# Patient Record
Sex: MALE | Race: OTHER | HISPANIC OR LATINO | URBAN - METROPOLITAN AREA
[De-identification: names, ages, dates, MRNs, and addresses within clinical notes are randomized per-mention and may not be internally consistent; named-entity substitution may affect disease eponyms.]

---

## 2019-12-20 ENCOUNTER — EMERGENCY (EMERGENCY)
Facility: HOSPITAL | Age: 29
LOS: 0 days | Discharge: HOME | End: 2019-12-20
Attending: EMERGENCY MEDICINE
Payer: MEDICAID

## 2019-12-20 VITALS — HEIGHT: 65.75 IN | WEIGHT: 115.96 LBS

## 2019-12-20 VITALS
OXYGEN SATURATION: 100 % | RESPIRATION RATE: 18 BRPM | TEMPERATURE: 99 F | DIASTOLIC BLOOD PRESSURE: 81 MMHG | HEART RATE: 70 BPM | SYSTOLIC BLOOD PRESSURE: 137 MMHG

## 2019-12-20 DIAGNOSIS — S80.811A ABRASION, RIGHT LOWER LEG, INITIAL ENCOUNTER: ICD-10-CM
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
## 2019-12-20 DIAGNOSIS — M79.661 PAIN IN RIGHT LOWER LEG: ICD-10-CM

## 2019-12-20 DIAGNOSIS — S80.11XA CONTUSION OF RIGHT LOWER LEG, INITIAL ENCOUNTER: ICD-10-CM
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
## 2019-12-20 DIAGNOSIS — Z23 ENCOUNTER FOR IMMUNIZATION: ICD-10-CM

## 2019-12-20 LAB — CK SERPL-CCNC: 508 U/L — HIGH (ref 26–308)

## 2019-12-20 PROCEDURE — 73590 X-RAY EXAM OF LOWER LEG: CPT | Mod: 26,RT

## 2019-12-20 PROCEDURE — 73600 X-RAY EXAM OF ANKLE: CPT | Mod: RT

## 2019-12-20 PROCEDURE — 73600 X-RAY EXAM OF ANKLE: CPT | Mod: 26,RT

## 2019-12-20 PROCEDURE — 90715 TDAP VACCINE 7 YRS/> IM: CPT

## 2019-12-20 PROCEDURE — 99284 EMERGENCY DEPT VISIT MOD MDM: CPT | Mod: 25

## 2019-12-20 PROCEDURE — 36415 COLL VENOUS BLD VENIPUNCTURE: CPT

## 2019-12-20 PROCEDURE — 73562 X-RAY EXAM OF KNEE 3: CPT | Mod: RT

## 2019-12-20 PROCEDURE — 90471 IMMUNIZATION ADMIN: CPT

## 2019-12-20 PROCEDURE — 73562 X-RAY EXAM OF KNEE 3: CPT | Mod: 26,RT

## 2019-12-20 PROCEDURE — 82550 ASSAY OF CK (CPK): CPT

## 2019-12-20 PROCEDURE — 99245 OFF/OP CONSLTJ NEW/EST HI 55: CPT

## 2019-12-20 PROCEDURE — 73590 X-RAY EXAM OF LOWER LEG: CPT | Mod: RT

## 2019-12-20 PROCEDURE — 99284 EMERGENCY DEPT VISIT MOD MDM: CPT

## 2019-12-20 RX ORDER — IBUPROFEN 200 MG
600 TABLET ORAL ONCE
Refills: 0 | Status: COMPLETED | OUTPATIENT
Start: 2019-12-20 | End: 2019-12-20

## 2019-12-20 RX ORDER — TETANUS TOXOID, REDUCED DIPHTHERIA TOXOID AND ACELLULAR PERTUSSIS VACCINE, ADSORBED 5; 2.5; 8; 8; 2.5 [IU]/.5ML; [IU]/.5ML; UG/.5ML; UG/.5ML; UG/.5ML
0.5 SUSPENSION INTRAMUSCULAR ONCE
Refills: 0 | Status: COMPLETED | OUTPATIENT
Start: 2019-12-20 | End: 2019-12-20

## 2019-12-20 RX ORDER — OXYCODONE AND ACETAMINOPHEN 5; 325 MG/1; MG/1
1 TABLET ORAL ONCE
Refills: 0 | Status: DISCONTINUED | OUTPATIENT
Start: 2019-12-20 | End: 2019-12-20

## 2019-12-20 RX ADMIN — OXYCODONE AND ACETAMINOPHEN 1 TABLET(S): 5; 325 TABLET ORAL at 16:51

## 2019-12-20 RX ADMIN — TETANUS TOXOID, REDUCED DIPHTHERIA TOXOID AND ACELLULAR PERTUSSIS VACCINE, ADSORBED 0.5 MILLILITER(S): 5; 2.5; 8; 8; 2.5 SUSPENSION INTRAMUSCULAR at 15:45

## 2019-12-20 RX ADMIN — Medication 600 MILLIGRAM(S): at 15:45

## 2019-12-20 NOTE — ED STATDOCS - PROGRESS NOTE DETAILS
signed Maria E Forde PA-C Pt seen initially in intake by Dr Richard.  ID [id number]  29M c/o right lower leg pain/injury after it got caught between a work vehicle and a plate that he stands on while operating a riding mower. pt didn't see the work vehicle and backed into in on the mower and his right lower leg was briefly caught between the 2 objects. Pt c/o pain and unable to wt bear since the accident. Also c/o numbness in right ankle. No significant findings on xray. Pt with significant TTP of right anterior compartment, moderate swelling. pain with passive plantarflexion/dorsiflexion of ankle. 2+DP/PT pulse. toes warm and pink, cap refill<2 sec all digits. Gross motor/sensation intact. No significant findings on xray.  I spoke to orthopedic resident who advised pt should be seen by surgery since there is no fx. I spoke to Dr Estrada for sx who will have resident see the pt but also requires ortho to see the pt in ED as well. signed Maria E Forde PA-C Pt seen initially in intake by Dr Richard.  ID [id number]  29M c/o right lower leg pain/injury after it got caught between a work vehicle and a plate that he stands on while operating a riding mower. pt didn't see the work vehicle and backed into in on the mower and his right lower leg was briefly caught between the 2 objects. Pt c/o pain and unable to wt bear since the accident. Also c/o numbness in right ankle. No significant findings on xray. Pt with significant TTP of right anterior compartment, moderate swelling. pain with passive plantarflexion/dorsiflexion of ankle. 2+DP/PT pulse. toes warm and pink, cap refill<2 sec all digits. Gross motor/sensation intact. No significant findings on xray.  I spoke to orthopedic resident Juan Jose who advised pt should be seen by surgery since there is no fx. I spoke to Dr Estrada for sx who will have resident see the pt but also requires ortho to see the pt in ED as well. Ortho resident Juan Jose conforms he will come see pt. signed Maria E Forde PA-C  ID [id number]  CPK not significantly elevated. No compartment syndrome, pt seen by ortho and surgery. outpt f/u WBAT, RICE. return precautions given, outpt f/u ortho. rx oxycodone. Pt feeling well at DC, agrees with DC and plan of care, ambulates without crutches after percocet, declines crutches. Pts boss giving him a ride home. Contusion/hematoma of lower leg. pulses 2+ DP/PT at DC.

## 2019-12-20 NOTE — ED STATDOCS - CARE PROVIDER_API CALL
Shan Nieves (MD)  Orthopaedic Surgery  [address] [place] [address] [place] [address]  Phone: [phone]  Fax: [phone]  Follow Up Time: 1-3 Days

## 2019-12-20 NOTE — CONSULT NOTE ADULT - SUBJECTIVE AND OBJECTIVE BOX
29M with R lower leg injury earlier today. Pt is primarily Latvian speaking and translation was done at bedside by patient's friend. Pt denied  services. Pt was on a riding lawn  when he fell off and had his right leg struck between the  and a car bumper. He was able to walk on it afterwards but with significant pain to RLE. Denies HS/LOC. Admits to numbness/tingling around dorsal foot and ankle. Denies pain/injury elsewhere. No other complaints. Of note, Pt had L clavicle ORIF done over 10 years ago at [facility]. No other orthopedic history.    CONSTITUTIONAL: No fever or chills  HEENT:  No headache, no sore throat  RESPIRATORY: No cough, wheezing, or shortness of breath  CARDIOVASCULAR: No chest pain, palpitations, or leg swelling  GASTROINTESTINAL: No nausea, vomiting, or diarrhea  GENITOURINARY: No dysuria, frequency, or hematuria  NEUROLOGICAL: no focal weakness or dizziness  SKIN:  No rashes or lesions   MUSCULOSKELETAL: no myalgias   PSYCHIATRIC: No depression or anxiety    PAST MEDICAL & SURGICAL HISTORY:  No pertinent past medical history  No significant past surgical history    Home Medications:  Denies    Allergies    No Known Allergies    Intolerances    Vital Signs Last 24 Hrs  T(C): 36.8 (20 Dec 2019 15:13), Max: 36.8 (20 Dec 2019 15:13)  T(F): 98.2 (20 Dec 2019 15:13), Max: 98.2 (20 Dec 2019 15:13)  HR: 80 (20 Dec 2019 15:13) (80 - 80)  BP: 159/108 (20 Dec 2019 15:13) (159/108 - 159/108)  BP(mean): --  RR: 18 (20 Dec 2019 15:13) (18 - 18)  SpO2: 100% (20 Dec 2019 15:13) (100% - 100%)    Imaging: XRays of R knee, tib/fib and ankle: No obvious fractures or bony pathology noted    Physical  Gen: NAD, AAOx3  RLE: +superficial abrasion over R mid lateral calf and posterior calf, bleeding controlled, no purulent fluid or foreign material noted, no bruising/erythema noted, +EHL/FHL/TA/GS, SILT L3-S1, diminished sensation in dorsal 1st web space and lateral ankle, +dp/pt pulses intact, compartments soft/compressible, +pain in calf with passive dorsiflexion/plantar flexion    Secondary Survey: No TTP over bony prominences, SILT, palpable pulses, full/painless range of motion, compartments soft

## 2019-12-20 NOTE — ED STATDOCS - OBJECTIVE STATEMENT
30 y/o male with no significant PMHx presents to the ED c/o right leg injury 30 minutes PTA. Pt is a , was working and was hit in his right leg by one of the machines. Pt now c/o of right leg pain. No other injuries. Pt unable to bear weight or ambulate secondary to pain. No other complaints at this time.

## 2019-12-20 NOTE — ED ADULT NURSE NOTE - OBJECTIVE STATEMENT
pt right shin was caught  between leaves blower and a trak while working . swelling to right shin with abrasion on the calf. pt stats he can not  bear weight on the right leg. denies any other injuries.

## 2019-12-20 NOTE — CONSULT NOTE ADULT - ASSESSMENT
29M with Right lower leg contusion  Pain control  Elevate RLE  Abrasions cleaned with hydrogen peroxide and dressed with 4x4s and ACE  No signs of compartment syndrome at this time  Advised patient if pain/swelling/numbness persists and worsens to return to ED for re-evaluation  No plan for orthopedic surgery intervention at this time  Follow up with Dr. Nieves as outpatient as needed, call office for appointment  Ortho stable for d/c

## 2019-12-20 NOTE — CONSULT NOTE ADULT - ASSESSMENT
28 y/o male with no significant PMHx presents to the ED c/o right leg injury 30 minutes PTA. Pt is a , was working and was hit in his right leg by one of the machines. Pt now c/o of right leg pain. No other injuries. Pt unable to bear weight or ambulate secondary to pain. No other complaints at this time.        Plan:    - Give tetanus  shot.  - Get HOWIE of the right leg.  - Please get CPK.  - Get Ortho evaluation.  - All compartments are soft, no signs of compartment syndrome by examination.  - No surgical intervention needed, surgery is signing off this patient, please reconsult again if needed, thanks.    The plan was discussed with Dr. Clemons 30 y/o male with no significant PMHx presents to the ED c/o right leg injury 30 minutes PTA. Pt is a , was working and was hit in his right leg by one of the machines. Pt now c/o of right leg pain. No other injuries. Pt unable to bear weight or ambulate secondary to pain. No other complaints at this time.    Right lower leg contusion    Plan:    - Give tetanus  shot.  - Get HOWIE of the right leg.  - Please get CPK.  Ortho on consult, no intervention, cleared for d/c with outpt follow up.  - All compartments are soft, no signs of compartment syndrome by examination.  - No general surgical intervention needed, trauma surgery is signing off this patient, please reconsult again if needed, thanks.    The plan was discussed with Dr. Clemons

## 2019-12-20 NOTE — ED STATDOCS - MUSCULOSKELETAL, MLM
TTP and swelling right lower shin/calf with 2 abrasions, one lateral and one posterior. Ankle and knee within normal limits.

## 2019-12-20 NOTE — ED STATDOCS - PATIENT PORTAL LINK FT
You can access the FollowMyHealth Patient Portal offered by [facility] by registering at the following website: [email or web address]. By joining [employer]’s FollowMyHealth portal, you will also be able to view your health information using other applications (apps) compatible with our system.

## 2019-12-20 NOTE — ED STATDOCS - NSFOLLOWUPINSTRUCTIONS_ED_ALL_ED_FT
Hematoma  Un hematoma es karley acumulación de james debajo de la piel, en un órgano, en un sitio del cuerpo, en un espacio articular o en otro tejido. La james puede espesarse (coagularse) para formar un bulto que se puede vida y sentir. El bulto suele ser firme y puede ser doloroso y sensible. La mayoría de los hematomas mejoran en unos pocos días o semanas. Sin embargo, algunos hematomas pueden ser graves y requieren atención médica. Los hematomas pueden variar desde muy pequeños hasta muy grandes.  ¿Cuáles son las causas?  Las causas de esta afección son:  Karley lesión cerrada o penetrante.Pérdida de james de un vaso sanguíneo bajo la piel.Algunos procedimientos médicos, incluidas las cirugías, mary las cirugías bucales, las de eliminación de arrugas y las de articulaciones.Algunas afecciones médicas que causan sangrado o moretones. Pueden aparecer varios hematomas en diferentes partes del cuerpo.¿Qué incrementa el riesgo?  Es más probable que contraiga esta afección si:  Es un adulto mayor.Usa anticoagulantes.¿Cuáles son los signos o los síntomas?     Los síntomas de esta afección dependen del lugar donde se encuentre el hematoma.   Los síntomas comunes de un hematoma que está debajo de la piel incluyen los siguientes:  Un bulto firme en el cuerpo.Dolor y sensibilidad en la keena.Moretones. La piel puede tornarse de color april, fallon púrpura o amarillo (coloración) en el lugar del hematoma si gibran está cerca de la superficie de la piel.Los síntomas comunes de un hematoma que está en un lugar profundo de los tejidos o espacios corporales pueden ser menos evidentes. Estos incluyen los siguientes:  Karley acumulación de james en el estómago (hematoma intraabdominal). [place] puede causar dolor en el abdomen, debilidad, desmayos y falta de aire. Karley acumulación de james dentro de la arden (hematoma intracraneal). Esta puede causar dolor de arden o síntomas mary debilidad, dificultad para hablar o para entender, o un cambio en el estado de conciencia. ¿Cómo se diagnostica?  Esta afección se diagnostica en función de lo siguiente:  Tracee antecedentes médicos.Un examen físico.Estudios de diagnóstico por imágenes, mary karley ecografía o karley exploración por tomografía computarizada (TC). [place] será necesario si el médico sospecha que hay un hematoma en tejidos o espacios corporales más profundos.Análisis de james. Puede que deban realizarle estos análisis si perez médico tima que el hematoma es la consecuencia de karley afección.¿Cómo se trata?  El tratamiento de esta afección depende de la causa, del tamaño y de la ubicación del hematoma. El tratamiento puede incluir:  No hacer nada. La mayoría de los hematomas no necesitan tratamiento porque muchos de ellos desaparecen solos con el tiempo.Cirugía o control minucioso. [place] puede ser necesario para los hematomas grandes o los hematomas que afectan a los órganos vitales.Medicamentos. Es posible que le den medicamentos si el hematoma tiene karley causa médica subyacente.Siga estas indicaciones en perez casa:  Control del dolor, la rigidez y la hinchazón        Si se lo indican, aplique hielo sobre la keena afectada.  Ponga el hielo en karley bolsa plástica.Coloque karley toalla entre la piel y la bolsa.Deje el hielo yohannes 20 minutos, de 2 a 3 veces por día, yohannes los primeros días.Si se lo indican, aplique calor en la keena afectada después de aplicar hielo por un par de días. Use la gay de calor que el médico le recomiende, mary karley compresa de calor húmedo o karley almohadilla térmica.  Coloque karley toalla entre la piel y la gay de calor. Aplique calor yohannes 20 a 30 minutos. Retire la gay de calor si la piel se pone de color fallon brillante. [place] es especialmente importante si no puede sentir dolor, calor o frío. Puede correr un riesgo mayor de sufrir quemaduras.Cuando esté sentado o acostado, levante (eleve) la keena afectada por encima del nivel del corazón.Si se lo indican, envuelva la keena afectada con karley venda elástica. La venda aplica presión (compresión) en la keena, lo que puede ayudar a reducir la hinchazón y a promover la curación. No la ajuste demasiado alrededor de la keena afectada.Si el hematoma está en karley pierna o un pie (extremidad inferior) y duele, puede que perez médico le recomiende el uso de muletas. Úselas mary se lo haya indicado el médico.Indicaciones generales     [place] los medicamentos de venta christopher y los recetados solamente mary se lo haya indicado el médico.Concurra a todas las visitas de seguimiento mary se lo haya indicado el médico. [place] es importante.Comuníquese con un médico si:  Tiene fiebre.La hinchazón y la coloración empeoran.Aparecen nuevos hematomas.Solicite ayuda inmediatamente si:  El dolor empeora o no se controla con los medicamentos.La piel sobre el hematoma se agrieta o comienza a sangrar.El hematoma se encuentra en el tórax o el abdomen y siente debilidad, le falta el aire o se altera perez conocimiento.Tiene un hematoma en el cuero cabelludo causado por karley caída o karley lesión y también tiene:  Un dolor de arden que empeora.Dificultad para hablar o comprender el lenguaje. Debilidad.Cambios en el estado de alerta o en la conciencia.Resumen  Un hematoma es karley acumulación de james debajo de la piel, en un órgano, en un sitio del cuerpo, en un espacio articular o en otro tejido.Generalmente, esta afección no necesita tratamiento, ya que muchos hematomas desaparecen solos con el tiempo.Los hematomas más grandes o los que puedan afectar órganos vitales pueden requerir un drenaje quirúrgico o controles. Si el hematoma es la consecuencia de karley afección, puede que le receten medicamentos.Solicite ayuda de inmediato si el hematoma se rompe o comienza a sangrar o si usted siente que le falta el aire, tiene dolor de arden o tiene dificultad para hablar después de karley caída.Esta información no tiene mary fin reemplazar el consejo del médico. Asegúrese de hacerle al médico cualquier pregunta que tenga.

## 2019-12-20 NOTE — CONSULT NOTE ADULT - SUBJECTIVE AND OBJECTIVE BOX
28 y/o male with no significant PMHx presents to the ED c/o right leg injury 30 minutes PTA. Pt is a , was working and was hit in his right leg by one of the machines. Pt now c/o of right leg pain. No other injuries. Pt unable to bear weight or ambulate secondary to pain. No other complaints at this time.      PAST MEDICAL/SURGICAL/FAMILY/SOCIAL HISTORY:   Past Medical History:  No pertinent past medical history.    ICU Vital Signs Last 24 Hrs  T(C): 36.8 (20 Dec 2019 15:13), Max: 36.8 (20 Dec 2019 15:13)  T(F): 98.2 (20 Dec 2019 15:13), Max: 98.2 (20 Dec 2019 15:13)  HR: 80 (20 Dec 2019 15:13) (80 - 80)  BP: 159/108 (20 Dec 2019 15:13) (159/108 - 159/108)  BP(mean): --  ABP: --  ABP(mean): --  RR: 18 (20 Dec 2019 15:13) (18 - 18)  SpO2: 100% (20 Dec 2019 15:13) (100% - 100%)      PHYSICAL EXAM:   · CONSTITUTIONAL: well appearing and in no apparent distress.	  · EYES: clear bilaterally.  Pupils equal, round, and reactive to light.	  · ENMT: Nasal mucosa clear.  Mouth with normal mucosa  Throat has no vesicles, no oropharyngeal exudates and uvula is midline.	  · CARDIAC: normal rate, regular rhythm, and no murmur.	  · RESPIRATORY: breath sounds clear and equal bilaterally.	  · GASTROINTESTINAL: abdomen soft, non-tender, and non-distended. Bowel sounds present.	  · MUSCULOSKELETAL: TTP and swelling right lower shin/calf with 2 abrasions, one lateral and one posterior. Ankle and knee within normal limits. DP and PT are palpable bilaterally. All compartments are soft.	  · NEUROLOGICAL: sensation is normal and strength is normal.	  · SKIN: skin normal color for race, warm, dry and intact.	    Imaging:    EXAM:  XR KNEE 3 VIEWS RT                            PROCEDURE DATE:  12/20/2019          INTERPRETATION:  CLINICAL HISTORY:Injury with  RIGHT knee pain.    TECHNIQUE: AP, lateral, and oblique radiographs     FINDINGS: The bone mineralization is normal. There is no fracture or dislocation. The joint spaces are unremarkable. No osseous lesion is noted. There is no effusion. No soft tissue abnormalities are identified.     IMPRESSION:  No radiographic osseous pathology.  If pain persist despite conservative therapy and soft tissue internal derangement or occult fracture is clinically suspected follow-up MRI recommended. Trauma Consult   Patient is a 29y old  Male who presents with a chief complaint of right leg pain, s/p trauma, 12/20/19    30 y/o male with no significant PMHx presents to the ED c/o right lower leg injury 30 minutes PTA. Pt is a , was working and was hit in his right lower leg by one of the machines. Pt now c/o of right lower leg pain. No other injuries. Pt unable to bear weight or ambulate secondary to pain. No other complaints at this time.    Pt seen and examined at bedside with chaperone and . Pt is AAOx3, pt in no acute distress. Pt denied c/o fever, chills, chest pain, SOB, abd pain, N/V/D, extremity dysfunction, hemoptysis, hematemesis, hematuria, hematochexia, headache, diplopia, vertigo, dizzyness.     ROS: right lower leg pain, otherwise as abovementioned      PAST MEDICAL/SURGICAL/FAMILY/SOCIAL HISTORY:   Past Medical History:  No pertinent past medical history.  Allergies: NKDS  SH: no reported etoh, tobacco, illicit drug use  FH: non contributory    ICU Vital Signs Last 24 Hrs  T(C): 36.8 (20 Dec 2019 15:13), Max: 36.8 (20 Dec 2019 15:13)  T(F): 98.2 (20 Dec 2019 15:13), Max: 98.2 (20 Dec 2019 15:13)  HR: 80 (20 Dec 2019 15:13) (80 - 80)  BP: 159/108 (20 Dec 2019 15:13) (159/108 - 159/108)  BP(mean): --  ABP: --  ABP(mean): --  RR: 18 (20 Dec 2019 15:13) (18 - 18)  SpO2: 100% (20 Dec 2019 15:13) (100% - 100%)      PHYSICAL EXAM:   · CONSTITUTIONAL: well appearing and in no apparent distress.	  · EYES: clear bilaterally.  Pupils equal, round, and reactive to light.	  · ENMT: Nasal mucosa clear.  Mouth with normal mucosa  Throat has no vesicles, no oropharyngeal exudates and uvula is midline.	  · CARDIAC: normal rate, regular rhythm, and no murmur.	  · RESPIRATORY: breath sounds clear and equal bilaterally.	  · GASTROINTESTINAL: abdomen soft, non-tender, and non-distended. Bowel sounds present.	  · MUSCULOSKELETAL: TTP and swelling right lower shin/calf with 2 abrasions, one lateral and one posterior. Ankle and knee within normal limits. DP and PT are palpable bilaterally. All compartments are soft.	  · NEUROLOGICAL: sensation is normal and strength is normal.	  · SKIN: skin normal color for race, warm, dry and intact.	    Attending exam:  GCS of 15  Airway is patent  Breathing is symmetric and unlabored  Neuro: CNII-XII grossly intact  Psych: normal affect  HEENT: Normocephalic, atraumatic, DEB, EOM wnl, no otorrhea or hemotympanum b/l, no epistaxis or d/c b/l nares, no craniofacial bony pathology or tenderness b/l  Neck: soft and supple to exam. No crepitus, no ecchymosis, no hematoma, to exam, no JVD, no tracheal deviation  Cspine/thoracolumbrosacral spine: no gross bony pathology or tenderness to exam  Cardiovascular: S1S2 Present  Chest: no gross rib pathology or tenderness to exam. No sternal pathology or tenderness to exam. No crepitus, no ecchymosis, no hematoma. No penetrating thorcoabdominal trauma  Respiratory: Rate is 18; Respiratory Effort normal; no wheezes, rales or rhonchi to exam  ABD: bowel sounds (+), soft, nontender, non distended, no rebound, no guarding, no rigidity, no skin changes to exam. No pelvic instability to exam, no skin changes  Genitourinary: No scrotal/perineal/perirectal hematoma/ecchymosis/tenderness to exam  External genitalia: normal, no blood at urethral meatus  Musculoskeletal: Pt has palpable b/l radial, femoral, dorsalis pedis pulses. All digits are warm and well perfused. Right lower leg in dressing per ortho, compartments are soft and compressible. Pt demonstrates grossly intact sensoromotor function. Pt has good capillary refill to digits, no calf edema or tenderness to exam otherwise.  Skin: right lower leg dermal abrasions, no lesions or rashes to exam otherwise    Imaging:    EXAM:  XR KNEE 3 VIEWS RT                            PROCEDURE DATE:  12/20/2019          INTERPRETATION:  CLINICAL HISTORY:Injury with  RIGHT knee pain.    TECHNIQUE: AP, lateral, and oblique radiographs     FINDINGS: The bone mineralization is normal. There is no fracture or dislocation. The joint spaces are unremarkable. No osseous lesion is noted. There is no effusion. No soft tissue abnormalities are identified.     IMPRESSION:  No radiographic osseous pathology.  If pain persist despite conservative therapy and soft tissue internal derangement or occult fracture is clinically suspected follow-up MRI recommended.

## 2021-05-27 ENCOUNTER — EMERGENCY (EMERGENCY)
Facility: HOSPITAL | Age: 31
LOS: 1 days | Discharge: HOME | End: 2021-05-27
Attending: STUDENT IN AN ORGANIZED HEALTH CARE EDUCATION/TRAINING PROGRAM
Payer: COMMERCIAL

## 2021-05-27 VITALS
DIASTOLIC BLOOD PRESSURE: 70 MMHG | SYSTOLIC BLOOD PRESSURE: 130 MMHG | HEART RATE: 100 BPM | OXYGEN SATURATION: 100 % | RESPIRATION RATE: 22 BRPM | TEMPERATURE: 101 F

## 2021-05-27 VITALS
DIASTOLIC BLOOD PRESSURE: 72 MMHG | OXYGEN SATURATION: 96 % | WEIGHT: 160.06 LBS | TEMPERATURE: 100 F | HEART RATE: 123 BPM | HEIGHT: 66.14 IN | RESPIRATION RATE: 30 BRPM | SYSTOLIC BLOOD PRESSURE: 135 MMHG

## 2021-05-27 LAB
ALBUMIN SERPL ELPH-MCNC: 4.2 G/DL — SIGNIFICANT CHANGE UP (ref 3.3–5.2)
ALP SERPL-CCNC: 73 U/L — SIGNIFICANT CHANGE UP (ref 40–120)
ALT FLD-CCNC: 54 U/L — HIGH
ANION GAP SERPL CALC-SCNC: 14 MMOL/L — SIGNIFICANT CHANGE UP (ref 5–17)
AST SERPL-CCNC: 38 U/L — SIGNIFICANT CHANGE UP
BASOPHILS # BLD AUTO: 0.03 K/UL — SIGNIFICANT CHANGE UP (ref 0–0.2)
BASOPHILS NFR BLD AUTO: 0.2 % — SIGNIFICANT CHANGE UP (ref 0–2)
BILIRUB SERPL-MCNC: 0.6 MG/DL — SIGNIFICANT CHANGE UP (ref 0.4–2)
BUN SERPL-MCNC: 7.6 MG/DL — LOW (ref 8–20)
CALCIUM SERPL-MCNC: 8.8 MG/DL — SIGNIFICANT CHANGE UP (ref 8.6–10.2)
CHLORIDE SERPL-SCNC: 98 MMOL/L — SIGNIFICANT CHANGE UP (ref 98–107)
CO2 SERPL-SCNC: 25 MMOL/L — SIGNIFICANT CHANGE UP (ref 22–29)
CREAT SERPL-MCNC: 1.14 MG/DL — SIGNIFICANT CHANGE UP (ref 0.5–1.3)
CRP SERPL-MCNC: 117 MG/L — HIGH
D DIMER BLD IA.RAPID-MCNC: 446 NG/ML DDU — HIGH
EOSINOPHIL # BLD AUTO: 0 K/UL — SIGNIFICANT CHANGE UP (ref 0–0.5)
EOSINOPHIL NFR BLD AUTO: 0 % — SIGNIFICANT CHANGE UP (ref 0–6)
FERRITIN SERPL-MCNC: 311 NG/ML — SIGNIFICANT CHANGE UP (ref 30–400)
GLUCOSE SERPL-MCNC: 104 MG/DL — HIGH (ref 70–99)
HCT VFR BLD CALC: 46.7 % — SIGNIFICANT CHANGE UP (ref 39–50)
HGB BLD-MCNC: 15 G/DL — SIGNIFICANT CHANGE UP (ref 13–17)
IMM GRANULOCYTES NFR BLD AUTO: 0.5 % — SIGNIFICANT CHANGE UP (ref 0–1.5)
LYMPHOCYTES # BLD AUTO: 1.84 K/UL — SIGNIFICANT CHANGE UP (ref 1–3.3)
LYMPHOCYTES # BLD AUTO: 13.1 % — SIGNIFICANT CHANGE UP (ref 13–44)
MCHC RBC-ENTMCNC: 24.5 PG — LOW (ref 27–34)
MCHC RBC-ENTMCNC: 32.1 GM/DL — SIGNIFICANT CHANGE UP (ref 32–36)
MCV RBC AUTO: 76.3 FL — LOW (ref 80–100)
MONOCYTES # BLD AUTO: 0.78 K/UL — SIGNIFICANT CHANGE UP (ref 0–0.9)
MONOCYTES NFR BLD AUTO: 5.5 % — SIGNIFICANT CHANGE UP (ref 2–14)
NEUTROPHILS # BLD AUTO: 11.36 K/UL — HIGH (ref 1.8–7.4)
NEUTROPHILS NFR BLD AUTO: 80.7 % — HIGH (ref 43–77)
PLATELET # BLD AUTO: 227 K/UL — SIGNIFICANT CHANGE UP (ref 150–400)
POTASSIUM SERPL-MCNC: 4 MMOL/L — SIGNIFICANT CHANGE UP (ref 3.5–5.3)
POTASSIUM SERPL-SCNC: 4 MMOL/L — SIGNIFICANT CHANGE UP (ref 3.5–5.3)
PROCALCITONIN SERPL-MCNC: 0.18 NG/ML — HIGH (ref 0.02–0.1)
PROT SERPL-MCNC: 7.7 G/DL — SIGNIFICANT CHANGE UP (ref 6.6–8.7)
RBC # BLD: 6.12 M/UL — HIGH (ref 4.2–5.8)
RBC # FLD: 13.8 % — SIGNIFICANT CHANGE UP (ref 10.3–14.5)
SARS-COV-2 RNA SPEC QL NAA+PROBE: SIGNIFICANT CHANGE UP
SODIUM SERPL-SCNC: 137 MMOL/L — SIGNIFICANT CHANGE UP (ref 135–145)
WBC # BLD: 14.08 K/UL — HIGH (ref 3.8–10.5)
WBC # FLD AUTO: 14.08 K/UL — HIGH (ref 3.8–10.5)

## 2021-05-27 PROCEDURE — 82728 ASSAY OF FERRITIN: CPT

## 2021-05-27 PROCEDURE — U0003: CPT

## 2021-05-27 PROCEDURE — 85379 FIBRIN DEGRADATION QUANT: CPT

## 2021-05-27 PROCEDURE — 93005 ELECTROCARDIOGRAM TRACING: CPT

## 2021-05-27 PROCEDURE — 84145 PROCALCITONIN (PCT): CPT

## 2021-05-27 PROCEDURE — 71045 X-RAY EXAM CHEST 1 VIEW: CPT | Mod: 26

## 2021-05-27 PROCEDURE — 85025 COMPLETE CBC W/AUTO DIFF WBC: CPT

## 2021-05-27 PROCEDURE — 99285 EMERGENCY DEPT VISIT HI MDM: CPT | Mod: 25

## 2021-05-27 PROCEDURE — 36415 COLL VENOUS BLD VENIPUNCTURE: CPT

## 2021-05-27 PROCEDURE — 71275 CT ANGIOGRAPHY CHEST: CPT

## 2021-05-27 PROCEDURE — 99284 EMERGENCY DEPT VISIT MOD MDM: CPT

## 2021-05-27 PROCEDURE — U0005: CPT

## 2021-05-27 PROCEDURE — 71275 CT ANGIOGRAPHY CHEST: CPT | Mod: 26,MA

## 2021-05-27 PROCEDURE — 86140 C-REACTIVE PROTEIN: CPT

## 2021-05-27 PROCEDURE — 71045 X-RAY EXAM CHEST 1 VIEW: CPT

## 2021-05-27 PROCEDURE — 93010 ELECTROCARDIOGRAM REPORT: CPT

## 2021-05-27 PROCEDURE — 80053 COMPREHEN METABOLIC PANEL: CPT

## 2021-05-27 RX ORDER — IBUPROFEN 200 MG
600 TABLET ORAL ONCE
Refills: 0 | Status: COMPLETED | OUTPATIENT
Start: 2021-05-27 | End: 2021-05-27

## 2021-05-27 RX ADMIN — Medication 600 MILLIGRAM(S): at 18:42

## 2021-05-27 NOTE — ED PROVIDER NOTE - OBJECTIVE STATEMENT
31 y/o M with no PMH c/o shortness of breath and cough which started yesterday.  Patient returned from [place] 2 days ago.  Patient also c/o subjective fever and diarrhea.  Denies vomiting, abdominal pain, chest pain.  Patient received his 2nd dose of covid vaccine yesterday.

## 2021-05-27 NOTE — ED PROVIDER NOTE - ATTENDING CONTRIBUTION TO CARE
31 yo male with acute cough and subjective fever. I personally saw the patient with the PA, and completed the key components of the history and physical exam. I then discussed the management plan with the PA.

## 2021-05-27 NOTE — ED PROVIDER NOTE - PATIENT PORTAL LINK FT
You can access the FollowMyHealth Patient Portal offered by [facility] by registering at the following website: [email or web address]. By joining [employer]’s FollowMyHealth portal, you will also be able to view your health information using other applications (apps) compatible with our system.

## 2021-05-27 NOTE — ED ADULT NURSE NOTE - VOIDING
without difficulty Complex Repair And Skin Substitute Graft Text: The defect edges were debeveled with a #15 scalpel blade.  The primary defect was closed partially with a complex linear closure.  Given the location of the remaining defect, shape of the defect and the proximity to free margins a skin substitute graft was deemed most appropriate to repair the remaining defect.  The graft was trimmed to fit the size of the remaining defect.  The graft was then placed in the primary defect, oriented appropriately, and sutured into place.

## 2021-05-27 NOTE — ED ADULT NURSE NOTE - OBJECTIVE STATEMENT
Pt awake and alert x4, Wolof speaking. Pt c/o abdominal pain and diarrhea since yesterday. Pt stating that he has been having a fever and feels SOB when talking. Pt stating he had COVID vaccine yesterday and has been feeling not himself since.

## 2021-05-27 NOTE — ED ADULT TRIAGE NOTE - CHIEF COMPLAINT QUOTE
pt with c/o 1 day of cough and fever, lots of abd pain after eating. received 2nd dose of covid vaccination yesterday. pt noted to be nervous and hyperventilating, encouraged to slow breathing with some improvement.

## 2021-06-07 ENCOUNTER — APPOINTMENT (OUTPATIENT)
Dept: THORACIC SURGERY | Facility: CLINIC | Age: 31
End: 2021-06-07
Payer: MEDICAID

## 2021-06-07 VITALS
HEIGHT: 66 IN | HEART RATE: 68 BPM | OXYGEN SATURATION: 98 % | WEIGHT: 160 LBS | DIASTOLIC BLOOD PRESSURE: 85 MMHG | TEMPERATURE: 98.1 F | RESPIRATION RATE: 18 BRPM | BODY MASS INDEX: 25.71 KG/M2 | SYSTOLIC BLOOD PRESSURE: 147 MMHG

## 2021-06-07 DIAGNOSIS — Z78.9 OTHER SPECIFIED HEALTH STATUS: ICD-10-CM

## 2021-06-07 DIAGNOSIS — R05 COUGH: ICD-10-CM

## 2021-06-07 DIAGNOSIS — Z87.898 PERSONAL HISTORY OF OTHER SPECIFIED CONDITIONS: ICD-10-CM

## 2021-06-07 DIAGNOSIS — Z23 ENCOUNTER FOR IMMUNIZATION: ICD-10-CM

## 2021-06-07 PROCEDURE — 99214 OFFICE O/P EST MOD 30 MIN: CPT

## 2021-06-07 PROCEDURE — 99072 ADDL SUPL MATRL&STAF TM PHE: CPT

## 2021-06-07 NOTE — CONSULT LETTER
[Dear  ___] : Dear  [unfilled], [Courtesy Letter:] : I had the pleasure of seeing your patient, [unfilled], in my office today. [Please see my note below.] : Please see my note below. [Consult Closing:] : Thank you very much for allowing me to participate in the care of this patient.  If you have any questions, please do not hesitate to contact me. [Sincerely,] : Sincerely, [FreeTextEntry2] : Dr. Tang Ellis Estpatrick   [FreeTextEntry3] : Laz Gonzales MD\par Department of Cardiovascular and Thoracic Surgery\par \par [employer]par [employer] [facility]

## 2021-06-07 NOTE — PHYSICAL EXAM
[General Appearance - Alert] : alert [General Appearance - In No Acute Distress] : in no acute distress [] : no respiratory distress [Auscultation Breath Sounds / Voice Sounds] : lungs were clear to auscultation bilaterally [Heart Rate And Rhythm] : heart rate was normal and rhythm regular [Heart Sounds] : normal S1 and S2 [Heart Sounds Gallop] : no gallops [Murmurs] : no murmurs [Heart Sounds Pericardial Friction Rub] : no pericardial rub [Examination Of The Chest] : the chest was normal in appearance [Chest Visual Inspection Thoracic Asymmetry] : no chest asymmetry [Diminished Respiratory Excursion] : normal chest expansion [No Focal Deficits] : no focal deficits [Oriented To Time, Place, And Person] : oriented to person, place, and time [Impaired Insight] : insight and judgment were intact [Affect] : the affect was normal

## 2021-06-07 NOTE — DATA REVIEWED
[FreeTextEntry1] : 6.1.21  CTA Chest PE with IV contrast  was negative for PE \par \par 5.27.21 Chest Xray from [facility]  showed no active pulmonary disease. \par

## 2021-06-07 NOTE — ASSESSMENT
[FreeTextEntry1] : Louie is a 30-year-old male with a recent episode of shortness of breath of unclear etiology. He had a CT scan done to rule out pulmonary embolus, which noted mild calcified adenopathy. There was a small area of abnormality in the right lower lobe arterial system, but it was felt not to represent pulmonary embolus by radiology. I like him to obtain a surveillance CT scan to evaluate his lymph nodes in 6 months and see me showed a thereafter.\par \par Thank you for allowing me to participate in the care of your patient.\par \par 45 minutes was spent during this encounter.\par \par Laz Gonzales MD\par Department of Cardiovascular and Thoracic Surgery\par \par [employer] [employer] [facility]\par

## 2021-06-07 NOTE — HISTORY OF PRESENT ILLNESS
[FreeTextEntry1] : Mr. ESPINOSA is a 30 year old male referred by Dr. Caleb Kang   for a initial consultation after evaluation from [facility] on 5.27.21. Chief complaint was of fever, abdominal pain after eating, and shortness of breath. He received 2nd dose of Covid vaccination the day before 5.27.21 Chest Xray showed no active pulmonary disease and 6.1.21  CTA Chest with IV contrast was unremarkable. He is here to discuss his progress.\par \par He reports that his shortness of breath has resolved. Prior to the COVID vaccine he had a frequent small cough.  \par \par  \par \par

## 2024-01-12 NOTE — ED ADULT NURSE NOTE - CHIEF COMPLAINT QUOTE
Patient presents complaining of right leg injury. Patient was injured while closing trunk of car.
Opt out